# Patient Record
Sex: MALE | Race: WHITE | NOT HISPANIC OR LATINO | URBAN - METROPOLITAN AREA
[De-identification: names, ages, dates, MRNs, and addresses within clinical notes are randomized per-mention and may not be internally consistent; named-entity substitution may affect disease eponyms.]

---

## 2017-01-01 ENCOUNTER — EMERGENCY (EMERGENCY)
Facility: HOSPITAL | Age: 77
LOS: 1 days | End: 2017-01-01
Attending: INTERNAL MEDICINE | Admitting: INTERNAL MEDICINE
Payer: MEDICARE

## 2017-01-01 VITALS — WEIGHT: 179.9 LBS | HEIGHT: 70 IN

## 2017-01-01 VITALS — OXYGEN SATURATION: 93 %

## 2017-01-01 PROCEDURE — 99285 EMERGENCY DEPT VISIT HI MDM: CPT

## 2017-01-01 RX ORDER — METOPROLOL TARTRATE 50 MG
0 TABLET ORAL
Qty: 0 | Refills: 0 | COMMUNITY

## 2017-01-01 RX ORDER — METFORMIN HYDROCHLORIDE 850 MG/1
1 TABLET ORAL
Qty: 0 | Refills: 0 | COMMUNITY

## 2017-01-01 RX ORDER — LOSARTAN POTASSIUM 100 MG/1
1 TABLET, FILM COATED ORAL
Qty: 0 | Refills: 0 | COMMUNITY

## 2017-01-01 RX ORDER — ROSUVASTATIN CALCIUM 5 MG/1
1 TABLET ORAL
Qty: 0 | Refills: 0 | COMMUNITY

## 2017-01-01 RX ORDER — CANAGLIFLOZIN 100 MG/1
1 TABLET, FILM COATED ORAL
Qty: 0 | Refills: 0 | COMMUNITY

## 2017-09-05 NOTE — ED PROVIDER NOTE - NS_ ATTENDINGSCRIBEDETAILS _ED_A_ED_FT
Vimal Maldonado MD - The scribe's documentation has been prepared under my direction and personally reviewed by me in its entirety. I confirm that the note above accurately reflects all work, treatment, procedures, and medical decision making performed by me.

## 2017-09-05 NOTE — ED PROVIDER NOTE - PHYSICAL EXAMINATION
intubated, right knee scar, cardiac dima device in progress,  respiratory sounds heard through bagging intubated, cardiac dima devicecompressing chest in progress,  respiratory sounds heard through bagging bilat equal.pupils fixed and dilated. intubated, dima device compressing chest in progress,  respiratory sounds heard bilaterally via ambu.pupils fixed and dilated.

## 2017-09-05 NOTE — ED PROVIDER NOTE - MEDICAL DECISION MAKING DETAILS
cardiac arrest at home after gardening and spraying chem to kill mold....unable to resuscitate..ME and family notified.family at bedside.ME(Gladys) released case.

## 2017-09-05 NOTE — ED PROVIDER NOTE - OBJECTIVE STATEMENT
78 y/o M pt with hx of diabetes, MI presents to ED in cardiac arrest. Per EMS pt was outside gardening, came inside because he wasn't feeling well. He sat in a chair and became unresponsive. EMS found sitting on a chair with eyes open and unresponsive. Approximately 20 minutes downtime PTA. 6 shocks administered with 5 epi, 1 bicarb and 300 amiodarone. 78 y/o M pt with hx of diabetes, MI presents to ED in cardiac arrest. Per EMS pt was outside gardening, came inside because he wasn't feeling well. He sat in a chair and became unresponsive. EMS found sitting on a chair with eyes open and unresponsive. Approximately 20 minutes downtime PTA. 6 shocks administered with 5 epi, 1 bicarb and 300 amiodarone. Per family pt was spaying something to kill mold on patio, came inside not feeling well which led to cardiac arrest. per family pt just discontinued anticoagulation he was on a few days ago. 78 y/o M pt with hx of diabetes, MI presents to ED in cardiac arrest. Per EMS pt was outside gardening, came inside because he wasn't feeling well. He sat in a chair and became unresponsive. EMS found pt sitting on a chair with eyes open but unresponsive.at least 20 minutes downtime PTA. 6 shocks administered with 5 epi, 1 bicarb and 300mg amiodarone. Per family pt was spaying something to kill mold on patio and did some trimming, came inside not feeling well which led to cardiac arrest. per family, pt just had anticoagulation dc'd  a few days ago  by his cardio.h/o dm,cad,hypercholest and htn.. 78 y/o M pt with hx of diabetes, MI presents to ED in cardiac arrest. Per EMS, pt was outside gardening, came inside because he wasn't feeling well. He sat in a chair and became unresponsive. EMS found pt sitting on a chair with eyes open but unresponsive.at least 20 minutes downtime PTA. 6 shocks administered with 5 epi, 1 bicarb and 300mg amiodarone. Per family pt was also spaying something to kill mold on patio and did some trimming, came inside not feeling well which led to cardiac arrest. per family, pt just had anticoagulation dc'd  a few days ago  by his cardio.h/o dm and htn as well.

## 2017-09-05 NOTE — ED ADULT TRIAGE NOTE - CHIEF COMPLAINT QUOTE
Cardiac arrest at home per EMS. C/O to wife that he was feeling like he was dying after working in the garden today and sat in a chair and became unresposnive

## 2023-04-17 NOTE — ED PROVIDER NOTE - SCRIBE NAME
Problem: Chronic Conditions and Co-morbidities  Goal: Patient's chronic conditions and co-morbidity symptoms are monitored and maintained or improved  Outcome: Progressing     Problem: Wound:  Goal: Will show signs of wound healing; wound closure and no evidence of infection  Description: Will show signs of wound healing; wound closure and no evidence of infection  Outcome: Progressing
Danica Mcdermott